# Patient Record
(demographics unavailable — no encounter records)

---

## 2025-07-08 NOTE — HISTORY OF PRESENT ILLNESS
[de-identified] : 07/08/2025 HPI: JAMSHID GONZALEZ is a 61 year y/o M who presents for work-related right knee injury on 06/20. Patient notes although pain has improved since, he continues to have tenderness and weakness in the right knee. Patient has attempted conservative measures including Advil with temporary relief.   Arthroscopic right knee surgery years ago but patient reports pain in his right knee resolved after this surgery. Patient has not been having right knee pain until this work-related injury on 06/20.    Patient is on Eliquis and aspirin 81mg daily.   **PATIENT HX OF PACEMAKER - NO MRI**   Patient presents today, 61 year M, for evaluation of their Rt knee Date of Injury/Onset: DOI 6/20/25 Mechanism of injury: Patient works at [employer] as a . On the DOI patient was coming down steps when he slipped, twisted his knee and hit knee on the railing This is a Work-Related Injury being treated under Worker's Compensation. This is not an athletic injury occurring associated with an interscholastic or organized sports team.   Pain: At Rest: 3-4 /10 With Activity: 8-10 /10 Quality of symptoms: constant aching pain in the right knee, sharp pain with walking. Pain is anterior and medial.   Affecting Sleep: Yes Stiffness upon waking, lasting greater than 30mins: Yes Difficulty with stairs: Yes Difficulty getting in and out of car: Yes Difficulty applying shoe: Yes Sit to stand stiffness: Yes Affects walking short/long distances? Yes Home/Work/Recreation affected? Yes   Improves with:  rest Worse with:  activity Have you been treated for this in the past? none Have you had surgery for this in the past? hx of knee scope 20+ years ago   OTC Medicines: states taking Advil and Ibuprofen RX medicines:  none Heat, Ice, Elevation: Ice and elevation   CSI or Gel Injections: None  Physical Therapy/ HEP: None recent   Previous Treatment/Imaging/Studies Since Last Visit: none     Enoxaparin/Lovenox increases your risk for bleeding. Notify your doctor if you experience any of the following side effects: unusual bleeding or bruising, coughing up or vomiting blood, red or black stool, blood in your urine, itching or hives, chest tightness, chest pain, shortness of breath, trouble breathing, swelling in your face or hands, swelling in your mouth or throat, fever, large flat blue or purplish patches on the skin, numbness or weakness in your arm or leg on one side, pain in your lower leg, sudden or severe headache with vision, speech or walking problems. When Enoxaparin/Lovenox is taken with other medicines, they can affect how it works. Taking other medications such as aspirin, blood thinners, and nonsteroidal anti-inflammatories increases your risk of bleeding. It is very important to tell your health care provider about all of the other medicines, including over-the-counter medications, herbs, and vitamins you are taking. DO NOT start, stop, or change the dosage of any medicine, including over-the-counter medicines, vitamins, and herbal products without your doctor’s approval. Any products containing aspirin or are nonsteroidal anti-inflammatories lessen the blood’s ability to form clots and adds to the effect of Enoxaparin/Lovenox. Never take aspirin or medicines that contain aspirin without speaking to your doctor.

## 2025-07-08 NOTE — ASSESSMENT
[FreeTextEntry1] : JAMSHID GONZALEZ is a 61-year y/o M with history and physical exam consistent with right knee MCL sprain and exacerbation of right knee osteoarthritis, following his work-related right knee injury.    - Recommend physical therapy to regain range of motion, strengthening and symptomatic improvement. Prescription given in office today.  - Recommend economy hinged knee brace - rx provided. Recommended patient wear for activities of daily living.    - After discussing prescription anti-inflammatories, the patient is unable to take NSAIDs, although indicated, as they are currently taking blood thinners. The patient was educated on the interaction between anti-coagulants and NSAIDs. - Recommend rest, ice, compression, elevation  - Recommend activity modification, avoid strenuous or high impact activities  - Patient has tried OTC's medications including Ibuprofen, Aleve, etc or prescription NSAIDS, and/or exercises at home and/or physical therapy without satisfactory response,   Patient had decreased mobility in the joint and the risks benefits, and alternatives have been discussed, and verbal consent was obtained.   PROCEDURE: Large joint injection was performed of the RIGHT Knee. The indication for this procedure was pain and inflammation. The anterolateral site of the knee was prepped with betadine and sterile technique used. A 22-Gauge needle was used to inject a total of 5cc which comprised of 4cc of 1% Lidocaine and 1cc of 40mg Kenalog.   The patient tolerated the procedure well. There was no bleeding, a band aid was applied.  Patient was advised to call if redness, pain or fever occur and to apply ice for 15 minutes out of every hour for the next 12-24 hours as tolerated as soreness is common once the intra-articular lidocaine wears off.  - Patient may return to work full duty without restriction.    Patient was educated on their diagnosis today. Emphasized the importance of gentle stretching and strengthening. Patient expressed understanding and all questions were answered today.   Follow up in 6 weeks to monitor progress. Can consider HA injection at that time.   *** NO MRI DUE TO PACEMAKER***

## 2025-07-30 NOTE — DISCUSSION/SUMMARY
[de-identified] :   DOI: 06/20/25 - currently working.  The patients condition is acute.  Confounding medical conditions/concerns: on Eiliquis, has a pacemaker Tests/Studies Independently Interpreted Today:  We re-reviewed a previous X-Ray of the R knee 4 views (AP, lateral, AP standing, and skyline) obtained on 07/08/2025 and independently reviewed them revealing evidence of mild degenerative OA.   ----------------------------------------------------------------------------------------------------------   Discussed risks of surgical treatment and nonsurgical treatment of arthritis, discussed risks of steroid injection plus or minus Visco supplementation, risks of Zilretta and benefits, role of surgery and MRI, risks and role of NSAIDs and side effects, benefits of therapy. The patient is aware and understands that if He fails all conservative treatment modalities, He should consider a total knee arthroplasty. The indication of a TKA is clinical and the patient decides to defer. In the interim, we will focus on conservative management of arthritic related pain.r   Surgery has been suggested a finite treatment. The risks and benefits of surgery have been discussed. Risks include but are not limited to bleeding, infection, reaction to anesthesia, injury to blood vessels and nerves, malunion, nonunion, DVT, PE, necessity of repeat surgery, chronic pain, loss of limb and death. The patient understands the risks and has chosen to proceed with conservative care. All questions have been answered.   He was given a CSI injection by previous doctor he saw and the injection gave moderate relief. The doctor he saw was unable to get an MRI for the pt due to the pt's pacemaker. Prescribed the pt a CT arthrogram of the R knee which should be able to be done with a pacemaker.   Patient ordered a brace himself for his R knee because  was unable to order a brace for him. He wears the brace at work which we advised him to continue doing.    Prescribed PT 2-3x per week for 4-6 weeks  Patient is on Eliquis and therefore cannot take NSAID's due to the risk of ulcer bleeding.   Follow up after CT-A.   MC

## 2025-07-30 NOTE — HISTORY OF PRESENT ILLNESS
[de-identified] :  Jul 30, 2025 Pt is here today complaining of Rt knee pain. Pt reports he was walking down the stairs when he realized he forgot something and turned around when he slipped on the steps and fell on the railing. Pt works as a [employer]  for [employer]. Pt reports anterior knee pain specifically below the patella and medially/laterally. Reports tightness posteriorly, but no pain. Hx scopes in both knees ~20 years ago. Pt had csi inj on 7/8. Reports significant relief with inj. Pain level:4. Reports increased pain with walking and twisting. Taking ibuprofen/Tylenol at night.   Pt is currently working with no restrictions.

## 2025-07-30 NOTE — WORK
[Repetitive Strain Injury] : repetitive strain injury [Was the competent medical cause of the injury] : was the competent medical cause of the injury [Are consistent with the injury] : are consistent with the injury [Consistent with my objective findings] : consistent with my objective findings [Partial] : partial [Does not reveal pre-existing condition(s) that may affect treatment/prognosis] : does not reveal pre-existing condition(s) that may affect treatment/prognosis

## 2025-07-30 NOTE — DISCUSSION/SUMMARY
[de-identified] :   DOI: 06/20/25 - currently working.  The patients condition is acute.  Confounding medical conditions/concerns: on Eiliquis, has a pacemaker Tests/Studies Independently Interpreted Today:  We re-reviewed a previous X-Ray of the R knee 4 views (AP, lateral, AP standing, and skyline) obtained on 07/08/2025 and independently reviewed them revealing evidence of mild degenerative OA.   ----------------------------------------------------------------------------------------------------------   Discussed risks of surgical treatment and nonsurgical treatment of arthritis, discussed risks of steroid injection plus or minus Visco supplementation, risks of Zilretta and benefits, role of surgery and MRI, risks and role of NSAIDs and side effects, benefits of therapy. The patient is aware and understands that if He fails all conservative treatment modalities, He should consider a total knee arthroplasty. The indication of a TKA is clinical and the patient decides to defer. In the interim, we will focus on conservative management of arthritic related pain.r   Surgery has been suggested a finite treatment. The risks and benefits of surgery have been discussed. Risks include but are not limited to bleeding, infection, reaction to anesthesia, injury to blood vessels and nerves, malunion, nonunion, DVT, PE, necessity of repeat surgery, chronic pain, loss of limb and death. The patient understands the risks and has chosen to proceed with conservative care. All questions have been answered.   He was given a CSI injection by previous doctor he saw and the injection gave moderate relief. The doctor he saw was unable to get an MRI for the pt due to the pt's pacemaker. Prescribed the pt a CT arthrogram of the R knee which should be able to be done with a pacemaker.   Patient ordered a brace himself for his R knee because  was unable to order a brace for him. He wears the brace at work which we advised him to continue doing.    Prescribed PT 2-3x per week for 4-6 weeks  Patient is on Eliquis and therefore cannot take NSAID's due to the risk of ulcer bleeding.   Follow up after CT-A.   MC

## 2025-07-30 NOTE — HISTORY OF PRESENT ILLNESS
[de-identified] :  Jul 30, 2025 Pt is here today complaining of Rt knee pain. Pt reports he was walking down the stairs when he realized he forgot something and turned around when he slipped on the steps and fell on the railing. Pt works as a [employer]  for [employer]. Pt reports anterior knee pain specifically below the patella and medially/laterally. Reports tightness posteriorly, but no pain. Hx scopes in both knees ~20 years ago. Pt had csi inj on 7/8. Reports significant relief with inj. Pain level:4. Reports increased pain with walking and twisting. Taking ibuprofen/Tylenol at night.   Pt is currently working with no restrictions.

## 2025-07-30 NOTE — PHYSICAL EXAM
[Right] : right knee [5___] : quadriceps 5[unfilled]/5 [] : antalgic [FreeTextEntry3] : +bakers cyst [TWNoteComboBox7] : flexion 125 degrees